# Patient Record
Sex: MALE | Race: WHITE | Employment: OTHER | URBAN - METROPOLITAN AREA
[De-identification: names, ages, dates, MRNs, and addresses within clinical notes are randomized per-mention and may not be internally consistent; named-entity substitution may affect disease eponyms.]

---

## 2019-07-18 ENCOUNTER — THERAPY VISIT (OUTPATIENT)
Dept: PHYSICAL THERAPY | Facility: CLINIC | Age: 84
End: 2019-07-18
Payer: COMMERCIAL

## 2019-07-18 DIAGNOSIS — M54.41 ACUTE BILATERAL LOW BACK PAIN WITH RIGHT-SIDED SCIATICA: ICD-10-CM

## 2019-07-18 PROCEDURE — 97161 PT EVAL LOW COMPLEX 20 MIN: CPT | Mod: GP | Performed by: PHYSICAL THERAPIST

## 2019-07-18 PROCEDURE — 97110 THERAPEUTIC EXERCISES: CPT | Mod: GP | Performed by: PHYSICAL THERAPIST

## 2019-07-18 NOTE — LETTER
[facility] PHYSICAL THERAPY  [address]  [place] [address]  [phone]    2019    Re: Tyler Cisneros   :   1935  MRN:  [id number]   REFERRING PHYSICIAN:   Haydee Espino    [facility] PHYSICAL THERAPY    Date of Initial Evaluation:  19  Visits:  Rxs Used: 1  Reason for Referral:  Acute bilateral low back pain with right-sided sciatica    EVALUATION SUMMARY    [facility] Initial Evaluation  Subjective:  The history is provided by the patient. No  was used.   Type of problem:  Lumbar   Condition occurred with:  Other reason (driving). This is a new condition   Problem details: Patient reports onset of low back and right leg pain beginning 6-8 weeks ago when he was driving..   Patient reports pain:  Lower lumbar spine. Radiates to:  Gluteals left, gluteals right, thigh right, lower leg right and foot right. Associated symptoms:  Loss of motion/stiffness. Symptoms are exacerbated by sitting and other (driving) and relieved by other (laying on his stomach).    Tyler Cisneros being seen for low back pain.   Problem began 7/10/2019 (MD orders). Where condition occurred: in the community.Problem occurred: driving  and reported as 8/10 on pain scale. General health as reported by patient is good. Pertinent medical history includes:  High blood pressure.    Surgeries include:  Orthopedic surgery. Other surgery history details: knee replacement.  Current medications:  High blood pressure medication.   Primary job tasks include:  Computer work and prolonged sitting.  Pain is described as sharp and aching and is intermittent.  Since onset symptoms are unchanged.  Patient is Retired.   Barriers include:  None as reported by patient.  Red flags:  None as reported by patient.                    Objective:    [place] Lumbar Evaluation    Posture:  Sitting: poor  Standing: fair  Lordosis: WNL  Lateral Shift: no  Correction of Posture: no  effect    Movement Loss:  Flexion (Flex): nil  Extension (EXT): mod  Side Glide R (SG R): mod  Side Glide L (SG L): min    Test Movements:  FIS: During: no effect  After: no effect      EIS: During: no effect  After: no effect    Repeat EIS: During: no effect  After: no effect  Mechanical Response: no effect    EIL: During: increases  After: no worse    Repeat EIL: During: increases and centralizing  After: better and centralizing  Mechanical Response: IncROM    Conclusion: derangement    Principle of Treatment:  Posture Correction: Neutral sitting with lumbar support    Extension: REIL 10x every 2-3 hours      Assessment/Plan:    Patient is a 84 year old male with lumbar complaints.    Patient has the following significant findings with corresponding treatment plan.                Diagnosis 1:  Low back pain  Pain -  self management, education, directional preference exercise and home program  Decreased ROM/flexibility - manual therapy, therapeutic exercise and home program  Impaired muscle performance - neuro re-education and home program  Decreased function - therapeutic activities and home program  Impaired posture - neuro re-education and home program    Therapy Evaluation Codes:     Cumulative Therapy Evaluation is: Low complexity.    Previous and current functional limitations:  (See Goal Flow Sheet for this information)    Short term and Long term goals: (See Goal Flow Sheet for this information)     Communication ability:  Patient appears to be able to clearly communicate and understand verbal and written communication and follow directions correctly.  Treatment Explanation - The following has been discussed with the patient:   RX ordered/plan of care  Anticipated outcomes  Possible risks and side effects  This patient would benefit from PT intervention to resume normal activities.   Rehab potential is good.    Frequency:  1 X week, once daily  Duration:  for 6 weeks  Discharge Plan:  Achieve all  LTG.  Independent in home treatment program.  Reach maximal therapeutic benefit.      Thank you for your referral.    INQUIRIES  Therapist: Jac Villarreal, Cert. BHUPINDER   [facility] PHYSICAL THERAPY  [address] [address]  Phone: [phone]  Fax: [phone]

## 2019-07-18 NOTE — PROGRESS NOTES
[facility] Initial Evaluation  Subjective:  The history is provided by the patient. No  was used.   Type of problem:  Lumbar   Condition occurred with:  Other reason (driving). This is a new condition   Problem details: Patient reports onset of low back and right leg pain beginning 6-8 weeks ago when he was driving..   Patient reports pain:  Lower lumbar spine. Radiates to:  Gluteals left, gluteals right, thigh right, lower leg right and foot right. Associated symptoms:  Loss of motion/stiffness. Symptoms are exacerbated by sitting and other (driving) and relieved by other (laying on his stomach).    Tyler Cisneros being seen for low back pain.   Problem began 7/10/2019 (MD orders). Where condition occurred: in the community.Problem occurred: driving  and reported as 8/10 on pain scale. General health as reported by patient is good. Pertinent medical history includes:  High blood pressure.    Surgeries include:  Orthopedic surgery. Other surgery history details: knee replacement.  Current medications:  High blood pressure medication.   Primary job tasks include:  Computer work and prolonged sitting.  Pain is described as sharp and aching and is intermittent.  Since onset symptoms are unchanged.      Patient is Retired.   Barriers include:  None as reported by patient.  Red flags:  None as reported by patient.                      Objective:  System    Physical Exam      [place] Lumbar Evaluation    Posture:  Sitting: poor  Standing: fair  Lordosis: WNL  Lateral Shift: no  Correction of Posture: no effect    Movement Loss:  Flexion (Flex): nil  Extension (EXT): mod  Side Glide R (SG R): mod  Side Glide L (SG L): min  Test Movements:  FIS: During: no effect  After: no effect      EIS: During: no effect  After: no effect    Repeat EIS: During: no effect  After: no effect  Mechanical Response: no effect    EIL: During: increases  After: no worse    Repeat EIL: During:  increases and centralizing  After: better and centralizing  Mechanical Response: IncROM        Conclusion: derangement  Principle of Treatment:  Posture Correction: Neutral sitting with lumbar support    Extension: REIL 10x every 2-3 hours                                           ROS    Assessment/Plan:    Patient is a 84 year old male with lumbar complaints.    Patient has the following significant findings with corresponding treatment plan.                Diagnosis 1:  Low back pain  Pain -  self management, education, directional preference exercise and home program  Decreased ROM/flexibility - manual therapy, therapeutic exercise and home program  Impaired muscle performance - neuro re-education and home program  Decreased function - therapeutic activities and home program  Impaired posture - neuro re-education and home program    Therapy Evaluation Codes:     Cumulative Therapy Evaluation is: Low complexity.    Previous and current functional limitations:  (See Goal Flow Sheet for this information)    Short term and Long term goals: (See Goal Flow Sheet for this information)     Communication ability:  Patient appears to be able to clearly communicate and understand verbal and written communication and follow directions correctly.  Treatment Explanation - The following has been discussed with the patient:   RX ordered/plan of care  Anticipated outcomes  Possible risks and side effects  This patient would benefit from PT intervention to resume normal activities.   Rehab potential is good.    Frequency:  1 X week, once daily  Duration:  for 6 weeks  Discharge Plan:  Achieve all LTG.  Independent in home treatment program.  Reach maximal therapeutic benefit.    Please refer to the daily flowsheet for treatment today, total treatment time and time spent performing 1:1 timed codes.

## 2019-11-12 PROBLEM — M54.41 ACUTE BILATERAL LOW BACK PAIN WITH RIGHT-SIDED SCIATICA: Status: RESOLVED | Noted: 2019-07-18 | Resolved: 2019-11-12

## 2019-11-12 NOTE — PROGRESS NOTES
Patient did not return to PT following initial evaluation on 7/18/19. Please let evaluation information serve as discharge information.

## 2021-02-26 ENCOUNTER — IMMUNIZATION (OUTPATIENT)
Dept: PEDIATRICS | Facility: CLINIC | Age: 86
End: 2021-02-26
Payer: COMMERCIAL

## 2021-02-26 PROCEDURE — 0001A PR COVID VAC PFIZER DIL RECON 30 MCG/0.3 ML IM: CPT

## 2021-02-26 PROCEDURE — 91300 PR COVID VAC PFIZER DIL RECON 30 MCG/0.3 ML IM: CPT

## 2021-02-27 ENCOUNTER — HEALTH MAINTENANCE LETTER (OUTPATIENT)
Age: 86
End: 2021-02-27

## 2021-03-19 ENCOUNTER — IMMUNIZATION (OUTPATIENT)
Dept: PEDIATRICS | Facility: CLINIC | Age: 86
End: 2021-03-19
Attending: INTERNAL MEDICINE
Payer: COMMERCIAL

## 2021-03-19 PROCEDURE — 0002A PR COVID VAC PFIZER DIL RECON 30 MCG/0.3 ML IM: CPT

## 2021-03-19 PROCEDURE — 91300 PR COVID VAC PFIZER DIL RECON 30 MCG/0.3 ML IM: CPT

## 2021-10-02 ENCOUNTER — HEALTH MAINTENANCE LETTER (OUTPATIENT)
Age: 86
End: 2021-10-02

## 2022-03-19 ENCOUNTER — HEALTH MAINTENANCE LETTER (OUTPATIENT)
Age: 87
End: 2022-03-19

## 2022-09-03 ENCOUNTER — HEALTH MAINTENANCE LETTER (OUTPATIENT)
Age: 87
End: 2022-09-03

## 2023-04-23 ENCOUNTER — HEALTH MAINTENANCE LETTER (OUTPATIENT)
Age: 88
End: 2023-04-23

## 2025-04-30 ENCOUNTER — TELEPHONE (OUTPATIENT)
Dept: GERIATRICS | Facility: CLINIC | Age: 89
End: 2025-04-30
Payer: COMMERCIAL

## 2025-04-30 DIAGNOSIS — R52 PAIN: Primary | ICD-10-CM

## 2025-04-30 RX ORDER — ACETAMINOPHEN 500 MG
1000 TABLET ORAL 3 TIMES DAILY
Status: SHIPPED
Start: 2025-04-30

## 2025-04-30 RX ORDER — OXYCODONE HYDROCHLORIDE 5 MG/1
5 TABLET ORAL EVERY 4 HOURS PRN
Qty: 18 TABLET | Refills: 0 | Status: SHIPPED | OUTPATIENT
Start: 2025-04-30 | End: 2025-05-01

## 2025-04-30 NOTE — TELEPHONE ENCOUNTER
[facility] TELEPHONE ENCOUNTER        Tyler Cisneros is a 89 year old  (1935),Nurse Loan called today to report: Patient new admit to TCU- to be patient of [facility] Geriatric team, Dr. Castellon and NP Moiz. S/p RTKA. Requesting increase in prn oxycodone to 5 mg (currently 2.5 mg). Just discharged from hospital yesterday. Current pain regimen includes prn acetaminophen, ibuprofen and oxycodone. No scheduled medications.     ASSESSMENT/PLAN  S/P RTKA,   Pain    ORDERS:    Discontinue current acetaminophen order    Acetaminophen 500 mg tablet- give 1000 mg po TID      -continue ibuprofen 400 mg po q 6 hours prn  - continue oxycodone 2.5 mg po q 4 hours prn  - continue to offer ice pack 20 minute intervals prn  - update primary providers in a.m if not effectively relieving pain    ADDENDUM  Received a call back a couple of hours later that pain is still not controlled. Different nurse who clarified that patient has been receiving Tylenol frequently though it is prn. Received 3 doses today and Ibuprofen twice plus the prn oxycodone 2.5 mg q 4 hours without effective pain relief.     Orders:     Continue newly scheduled acetaminophen  Increase prn oxycodone to 5 mg q 4 hours prn  Update primary NP tomorrow.     Electronically signed by:   CARMEL Kumar CNP

## 2025-05-01 ENCOUNTER — TRANSITIONAL CARE UNIT VISIT (OUTPATIENT)
Dept: GERIATRICS | Facility: CLINIC | Age: 89
End: 2025-05-01
Payer: COMMERCIAL

## 2025-05-01 ENCOUNTER — TELEPHONE (OUTPATIENT)
Dept: GERIATRICS | Facility: CLINIC | Age: 89
End: 2025-05-01

## 2025-05-01 ENCOUNTER — TRANSFERRED RECORDS (OUTPATIENT)
Dept: HEALTH INFORMATION MANAGEMENT | Facility: CLINIC | Age: 89
End: 2025-05-01

## 2025-05-01 ENCOUNTER — DOCUMENTATION ONLY (OUTPATIENT)
Dept: GERIATRICS | Facility: CLINIC | Age: 89
End: 2025-05-01

## 2025-05-01 VITALS
TEMPERATURE: 99.3 F | DIASTOLIC BLOOD PRESSURE: 66 MMHG | WEIGHT: 191 LBS | RESPIRATION RATE: 17 BRPM | SYSTOLIC BLOOD PRESSURE: 160 MMHG | HEART RATE: 69 BPM | OXYGEN SATURATION: 95 %

## 2025-05-01 DIAGNOSIS — I10 HYPERTENSION, UNSPECIFIED TYPE: ICD-10-CM

## 2025-05-01 DIAGNOSIS — Z71.89 ACP (ADVANCE CARE PLANNING): ICD-10-CM

## 2025-05-01 DIAGNOSIS — R60.0 LEG EDEMA, RIGHT: ICD-10-CM

## 2025-05-01 DIAGNOSIS — K59.01 SLOW TRANSIT CONSTIPATION: ICD-10-CM

## 2025-05-01 DIAGNOSIS — M25.561 ACUTE PAIN OF RIGHT KNEE: ICD-10-CM

## 2025-05-01 DIAGNOSIS — Z96.651 S/P TOTAL KNEE ARTHROPLASTY, RIGHT: ICD-10-CM

## 2025-05-01 DIAGNOSIS — R45.1 AGITATION: ICD-10-CM

## 2025-05-01 DIAGNOSIS — M17.11 ARTHRITIS OF RIGHT KNEE: Primary | ICD-10-CM

## 2025-05-01 RX ORDER — AMLODIPINE BESYLATE 10 MG/1
1 TABLET ORAL DAILY
COMMUNITY
Start: 2024-03-19

## 2025-05-01 RX ORDER — ASPIRIN 81 MG/1
81 TABLET, COATED ORAL 2 TIMES DAILY
COMMUNITY
Start: 2025-04-28 | End: 2025-05-28

## 2025-05-01 RX ORDER — SENNOSIDES 8.6 MG
2 TABLET ORAL DAILY
COMMUNITY

## 2025-05-01 RX ORDER — OXYCODONE HYDROCHLORIDE 5 MG/1
5 TABLET ORAL EVERY 4 HOURS
Status: SHIPPED
Start: 2025-05-01 | End: 2025-05-01

## 2025-05-01 RX ORDER — IBUPROFEN 400 MG/1
400 TABLET, FILM COATED ORAL EVERY 6 HOURS PRN
COMMUNITY
Start: 2025-04-29 | End: 2025-05-01

## 2025-05-01 RX ORDER — LOSARTAN POTASSIUM 50 MG/1
50 TABLET ORAL DAILY
COMMUNITY

## 2025-05-01 RX ORDER — OXYCODONE HYDROCHLORIDE 5 MG/1
5 TABLET ORAL EVERY 4 HOURS
Qty: 60 TABLET | Refills: 0 | Status: SHIPPED | OUTPATIENT
Start: 2025-05-01

## 2025-05-01 RX ORDER — POLYETHYLENE GLYCOL 3350 17 G/17G
17 POWDER, FOR SOLUTION ORAL DAILY
COMMUNITY

## 2025-05-01 NOTE — LETTER
5/1/2025      Tyler Cisneros  [address]  [place] [address]        [facility] GERIATRICS    PRIMARY CARE PROVIDER AND CLINIC:  Jamila Sahu MD, [address] [facility] [address]  Chief Complaint   Patient presents with     Hospital F/U      [place] Medical Record Number:  [id number]  Place of Service where encounter took place:  [facility]) [[id number]]    Tyler Cisneros  is a 89 year old  (1935), admitted to the above facility from  [facility] . Hospital stay 4/28/2025 through 4/29/2025..   HPI:    1. Arthritis of right knee    2. S/P total knee arthroplasty, right    3. Acute pain of right knee    4. Leg edema, right    5. Slow transit constipation    6. Hypertension, unspecified type    7. Agitation    8. ACP (advance care planning)      Patient with above recent Dx/Hx, post elective R TKA, tolerated well, dressing intact, WBAT, independently transferring in room today, pain+, reports no recent BM, very displeased with being unable to control/have more oxycodone, called 911 last night to have police come and make staff give more oxycodone, argumentative and angry with all staff including myself, non-physical, attempt to console ineffective, was agreeable to plan changes below, SBP's elevated possibly due to pain or anger, overall appears healthy.    Advance Care Planning Goals of Care Discussion 5/1/2025  Goals of care discussed with Tyler Cisneros on 5/1. Present at discussion: patient. Questions discussed and patient response:  What is your understanding now of where you are with your illness?: good. How much information about what is likely to be ahead with your illness would you like to have?: all. As the clinician I communicated the following to the patient regarding their prognosis: good. If your health situation worsens, what are your most important goals?: it wont. What are your biggest fears and worries about the future with your health?: none.  Unacceptable function : NA. What abilities are so critical to your life that you cannot imagine living without them?: pain control. Pt does NOT want to: die. If you become sicker, how much are you willing to go through for the possibility of gaining more time?: I wont. Would this change if these were permanent states, if they did not get better?: no. How much does your agent and/or family know about your priorities and wishes?: nothing. Added by CARMEL Lares CNP       CODE STATUS/ADVANCE DIRECTIVES DISCUSSION:  Full Code  CPR/Full code   ALLERGIES: No Known Allergies   PAST MEDICAL HISTORY: History reviewed. No pertinent past medical history.   PAST SURGICAL HISTORY:   has no past surgical history on file.  FAMILY HISTORY: Family history is unknown by patient.  SOCIAL HISTORY:   reports that he has never smoked. He has never used smokeless tobacco.  Patient's living condition: lives in an assisted living facility    Post Discharge Medication Reconciliation Status:   MED REC REQUIRED  Post Medication Reconciliation Status: discharge medications reconciled and changed, per note/orders       Current Outpatient Medications   Medication Sig Dispense Refill     amLODIPine (NORVASC) 10 MG tablet Take 1 tablet by mouth daily.       aspirin (ASA) 81 MG EC tablet Take 81 mg by mouth 2 times daily.       Multiple Vitamins-Minerals (PRESERVISION AREDS 2 PO) Take 2 tablets by mouth daily.       oxyCODONE (ROXICODONE) 5 MG tablet Take 1 tablet (5 mg) by mouth every 4 hours. And 5mg Q4h PRN       polyethylene glycol (MIRALAX) 17 g packet Take 17 g by mouth daily.       sennosides (SENOKOT) 8.6 MG tablet Take 2 tablets by mouth daily.       acetaminophen (TYLENOL) 500 MG tablet Take 2 tablets (1,000 mg) by mouth 3 times daily.       losartan (COZAAR) 50 MG tablet Take 50 mg by mouth daily.       omeprazole 20 MG tablet Take 20 mg by mouth daily.       No current facility-administered medications for this visit.        ROS:  4 point ROS including Respiratory, CV, GI and , other than that noted in the HPI,  is negative    Vitals:  BP (!) 160/66   Pulse 69   Temp 99.3  F (37.4  C)   Resp 17   Wt 86.6 kg (191 lb)   SpO2 95%   Exam:  GENERAL APPEARANCE:  appears healthy  ENT:  Mouth and posterior oropharynx normal, moist mucous membranes  RESP:  lungs clear to auscultation , no respiratory distress  CV:  peripheral edema 1-2 firm+ in RLE, rate-normal  ABDOMEN:  bowel sounds normal  M/S:   Gait and station abnormal unsteady  SKIN:  Inspection of skin and subcutaneous tissue baseline  NEURO:   Examination of sensation by touch normal  PSYCH:  affect and mood normal    Lab/Diagnostic data:  Recent labs in [facility] reviewed by me today.     ASSESSMENT/PLAN:    (M17.11) Arthritis of right knee  (primary encounter diagnosis)  (Z96.651) S/P total knee arthroplasty, right  (M25.561) Acute pain of right knee  (R60.0) Leg edema, right  (K59.01) Slow transit constipation  Comment: extreme pain, dressing CDI, no recent BM, s/p orif 4/28, independent transfers  Plan:   -PT/OT eval and treat  -US RLE for edema  -lymph eval  -tubigrip RLE knee high  -change oxycodone to 5mg Q4h and Q4h PRN  -discontinue ibuprofen  -continue ASA81 BID  -continue APAP TID  -change miralax to daily  -change senna to 2 tabs daily  -CBC, BMP on 5/5  -follow up ortho PRN    (I10) Hypertension, unspecified type  Comment: SBP's 160 range  Plan:   -continue PTA amlodipine, losartan  -daily vitals    (R45.1) Agitation  Comment: unsure if normal personality, pain or opioid use, argumentative, not pleasant to staff  Plan:   -increase oxycodone as requested  -informed nursing of status  -of note, already standing, per therapy will be short stay    (Z71.89) ACP (advance care planning)  Comment: full code  Plan: [place] ADVANCE CARE PLANNING FIRST 30 MINS          I spent 17 minutes F2F with patient in addition to todays visit completing a POLST form.        Electronically  signed by:  CARMEL Lares CNP                    Sincerely,        CARMEL Lares CNP    Electronically signed

## 2025-05-01 NOTE — PROGRESS NOTES
Nurse calling to report pelvis pain. Recent right knee replacement. Uncontrolled  VSS, no nausea or vomiting, urinating okay.   Report no recent BM, not on stool softeners.     Please give another Oxy 5mg po now x1, add senna-s 1 bid, PVR scan to make sure not retaining. Rectal supp x 1.     Update primary today, may need Abd x ray.   Call back if worsening symptoms.     Aurora WOODWARD NP

## 2025-05-01 NOTE — PROGRESS NOTES
[facility] GERIATRICS    PRIMARY CARE PROVIDER AND CLINIC:  Jamila Sahu MD, [address]  / [facility] [address]  Chief Complaint   Patient presents with    Hospital F/U      [place] Medical Record Number:  [id number]  Place of Service where encounter took place:  [facility] [[id number]]    Tyler Cisneros  is a 89 year old  (1935), admitted to the above facility from  [facility] . Hospital stay 4/28/2025 through 4/29/2025..   HPI:    1. Arthritis of right knee    2. S/P total knee arthroplasty, right    3. Acute pain of right knee    4. Leg edema, right    5. Slow transit constipation    6. Hypertension, unspecified type    7. Agitation    8. ACP (advance care planning)      Patient with above recent Dx/Hx, post elective R TKA, tolerated well, dressing intact, WBAT, independently transferring in room today, pain+, reports no recent BM, very displeased with being unable to control/have more oxycodone, called 911 last night to have police come and make staff give more oxycodone, argumentative and angry with all staff including myself, non-physical, attempt to console ineffective, was agreeable to plan changes below, SBP's elevated possibly due to pain or anger, overall appears healthy.    Advance Care Planning Goals of Care Discussion 5/1/2025  Goals of care discussed with Tyler Cisneros on 5/1. Present at discussion: patient. Questions discussed and patient response:  What is your understanding now of where you are with your illness?: good. How much information about what is likely to be ahead with your illness would you like to have?: all. As the clinician I communicated the following to the patient regarding their prognosis: good. If your health situation worsens, what are your most important goals?: it wont. What are your biggest fears and worries about the future with your health?: none. Unacceptable function : NA. What abilities are so critical to your life that you cannot  imagine living without them?: pain control. Pt does NOT want to: die. If you become sicker, how much are you willing to go through for the possibility of gaining more time?: I wont. Would this change if these were permanent states, if they did not get better?: no. How much does your agent and/or family know about your priorities and wishes?: nothing. Added by CARMEL Lares CNP       CODE STATUS/ADVANCE DIRECTIVES DISCUSSION:  Full Code  CPR/Full code   ALLERGIES: No Known Allergies   PAST MEDICAL HISTORY: History reviewed. No pertinent past medical history.   PAST SURGICAL HISTORY:   has no past surgical history on file.  FAMILY HISTORY: Family history is unknown by patient.  SOCIAL HISTORY:   reports that he has never smoked. He has never used smokeless tobacco.  Patient's living condition: lives in an assisted living facility    Post Discharge Medication Reconciliation Status:   MED REC REQUIRED  Post Medication Reconciliation Status: discharge medications reconciled and changed, per note/orders       Current Outpatient Medications   Medication Sig Dispense Refill    amLODIPine (NORVASC) 10 MG tablet Take 1 tablet by mouth daily.      aspirin (ASA) 81 MG EC tablet Take 81 mg by mouth 2 times daily.      Multiple Vitamins-Minerals (PRESERVISION AREDS 2 PO) Take 2 tablets by mouth daily.      oxyCODONE (ROXICODONE) 5 MG tablet Take 1 tablet (5 mg) by mouth every 4 hours. And 5mg Q4h PRN      polyethylene glycol (MIRALAX) 17 g packet Take 17 g by mouth daily.      sennosides (SENOKOT) 8.6 MG tablet Take 2 tablets by mouth daily.      acetaminophen (TYLENOL) 500 MG tablet Take 2 tablets (1,000 mg) by mouth 3 times daily.      losartan (COZAAR) 50 MG tablet Take 50 mg by mouth daily.      omeprazole 20 MG tablet Take 20 mg by mouth daily.       No current facility-administered medications for this visit.       ROS:  4 point ROS including Respiratory, CV, GI and , other than that noted in the HPI,  is  negative    Vitals:  BP (!) 160/66   Pulse 69   Temp 99.3  F (37.4  C)   Resp 17   Wt 86.6 kg (191 lb)   SpO2 95%   Exam:  GENERAL APPEARANCE:  appears healthy  ENT:  Mouth and posterior oropharynx normal, moist mucous membranes  RESP:  lungs clear to auscultation , no respiratory distress  CV:  peripheral edema 1-2 firm+ in RLE, rate-normal  ABDOMEN:  bowel sounds normal  M/S:   Gait and station abnormal unsteady  SKIN:  Inspection of skin and subcutaneous tissue baseline  NEURO:   Examination of sensation by touch normal  PSYCH:  affect and mood normal    Lab/Diagnostic data:  Recent labs in [facility] reviewed by me today.     ASSESSMENT/PLAN:    (M17.11) Arthritis of right knee  (primary encounter diagnosis)  (Z96.651) S/P total knee arthroplasty, right  (M25.561) Acute pain of right knee  (R60.0) Leg edema, right  (K59.01) Slow transit constipation  Comment: extreme pain, dressing CDI, no recent BM, s/p orif 4/28, independent transfers  Plan:   -PT/OT eval and treat  -US RLE for edema  -lymph eval  -tubigrip RLE knee high  -change oxycodone to 5mg Q4h and Q4h PRN  -discontinue ibuprofen  -continue ASA81 BID  -continue APAP TID  -change miralax to daily  -change senna to 2 tabs daily  -CBC, BMP on 5/5  -follow up ortho PRN    (I10) Hypertension, unspecified type  Comment: SBP's 160 range  Plan:   -continue PTA amlodipine, losartan  -daily vitals    (R45.1) Agitation  Comment: unsure if normal personality, pain or opioid use, argumentative, not pleasant to staff  Plan:   -increase oxycodone as requested  -informed nursing of status  -of note, already standing, per therapy will be short stay    (Z71.89) ACP (advance care planning)  Comment: full code  Plan: [place] ADVANCE CARE PLANNING FIRST 30 MINS          I spent 17 minutes F2F with patient in addition to todays visit completing a POLST form.    ADDEND: patient has changed mind about oxycodone, wants to have some input on the dose he gets based on pain level.   Changed to 5mg Q4h scheduled, 2.5mg Q4h PRN pain 1-5/10, 5mg Q4h PRN pain 6-10/10, sent Rx to [facility] pharmacy along with Narcan script in event of complications regarding opioid use.    Electronically signed by:  CARMEL Lares CNP

## 2025-05-05 ENCOUNTER — LAB REQUISITION (OUTPATIENT)
Dept: LAB | Facility: CLINIC | Age: 89
End: 2025-05-05
Payer: COMMERCIAL

## 2025-05-05 DIAGNOSIS — D64.9 ANEMIA, UNSPECIFIED: ICD-10-CM

## 2025-05-05 DIAGNOSIS — N18.9 CHRONIC KIDNEY DISEASE, UNSPECIFIED: ICD-10-CM

## 2025-05-05 LAB
ANION GAP SERPL CALCULATED.3IONS-SCNC: 9 MMOL/L (ref 7–15)
BUN SERPL-MCNC: 18.1 MG/DL (ref 8–23)
CALCIUM SERPL-MCNC: 8.5 MG/DL (ref 8.8–10.4)
CHLORIDE SERPL-SCNC: 101 MMOL/L (ref 98–107)
CREAT SERPL-MCNC: 0.99 MG/DL (ref 0.67–1.17)
EGFRCR SERPLBLD CKD-EPI 2021: 73 ML/MIN/1.73M2
ERYTHROCYTE [DISTWIDTH] IN BLOOD BY AUTOMATED COUNT: 12.6 % (ref 10–15)
GLUCOSE SERPL-MCNC: 88 MG/DL (ref 70–99)
HCO3 SERPL-SCNC: 25 MMOL/L (ref 22–29)
HCT VFR BLD AUTO: 35.3 % (ref 40–53)
HGB BLD-MCNC: 11.2 G/DL (ref 13.3–17.7)
MCH RBC QN AUTO: 30.7 PG (ref 26.5–33)
MCHC RBC AUTO-ENTMCNC: 31.7 G/DL (ref 31.5–36.5)
MCV RBC AUTO: 97 FL (ref 78–100)
PLATELET # BLD AUTO: 200 10E3/UL (ref 150–450)
POTASSIUM SERPL-SCNC: 4.6 MMOL/L (ref 3.4–5.3)
RBC # BLD AUTO: 3.65 10E6/UL (ref 4.4–5.9)
SODIUM SERPL-SCNC: 135 MMOL/L (ref 135–145)
WBC # BLD AUTO: 8.4 10E3/UL (ref 4–11)

## 2025-05-05 PROCEDURE — 85027 COMPLETE CBC AUTOMATED: CPT | Mod: ORL | Performed by: NURSE PRACTITIONER

## 2025-05-05 PROCEDURE — 80048 BASIC METABOLIC PNL TOTAL CA: CPT | Mod: ORL | Performed by: NURSE PRACTITIONER

## 2025-05-06 ENCOUNTER — TRANSITIONAL CARE UNIT VISIT (OUTPATIENT)
Dept: GERIATRICS | Facility: CLINIC | Age: 89
End: 2025-05-06
Payer: COMMERCIAL

## 2025-05-06 VITALS
WEIGHT: 192 LBS | RESPIRATION RATE: 17 BRPM | DIASTOLIC BLOOD PRESSURE: 65 MMHG | OXYGEN SATURATION: 92 % | TEMPERATURE: 98.2 F | HEART RATE: 66 BPM | SYSTOLIC BLOOD PRESSURE: 158 MMHG

## 2025-05-06 DIAGNOSIS — Z96.651 S/P TOTAL KNEE ARTHROPLASTY, RIGHT: ICD-10-CM

## 2025-05-06 DIAGNOSIS — M25.561 ACUTE PAIN OF RIGHT KNEE: ICD-10-CM

## 2025-05-06 DIAGNOSIS — M17.11 ARTHRITIS OF RIGHT KNEE: Primary | ICD-10-CM

## 2025-05-06 DIAGNOSIS — L03.115 CELLULITIS OF RIGHT KNEE: ICD-10-CM

## 2025-05-06 PROCEDURE — 99309 SBSQ NF CARE MODERATE MDM 30: CPT | Performed by: NURSE PRACTITIONER

## 2025-05-06 RX ORDER — CEPHALEXIN 500 MG/1
500 CAPSULE ORAL 3 TIMES DAILY
Status: SHIPPED
Start: 2025-05-06 | End: 2025-05-13

## 2025-05-06 NOTE — LETTER
5/6/2025      Tyler Cisneros  [address] [place] [address]        [facility] GERIATRICS    Chief Complaint   Patient presents with     RECHECK     HPI:  Tyler Cisneros is a 89 year old  (1935), who is being seen today for an episodic care visit at: [facility] AT [facility] ([facility]) [[id number]]. Today's concern is:   1. Arthritis of right knee    2. Acute pain of right knee    3. Cellulitis of right knee    4. S/P total knee arthroplasty, right      Patient seen for follow up, elective ORIF R knee due to arthritis and pain, dressing intact, firm edema, doppler on 5/1 negative DVT, afebrile, medial knee has small area outside of dressing that is pink, warm.    Allergies, and PMH/PSH reviewed in EPIC today.  REVIEW OF SYSTEMS:  4 point ROS including Respiratory, CV, GI and , other than that noted in the HPI,  is negative    Objective:   BP (!) 158/65   Pulse 66   Temp 98.2  F (36.8  C)   Resp 17   Wt 87.1 kg (192 lb)   SpO2 92%   GENERAL APPEARANCE:  in no distress, appears healthy  ENT:  Mouth and posterior oropharynx normal, moist mucous membranes  RESP:  lungs clear to auscultation , no respiratory distress  CV:  peripheral edema mild/firm+ in RLE, rate-normal  ABDOMEN:  bowel sounds normal  M/S:   Gait and station abnormal unsteady  SKIN:  Inspection of skin and subcutaneous tissue baseline  NEURO:   Examination of sensation by touch normal  PSYCH:  affect and mood normal    Labs done in SNF are in [facility]. Please refer to them using [facility]/Care Everywhere.    Assessment/Plan:  (M17.11) Arthritis of right knee  (primary encounter diagnosis)  (M25.561) Acute pain of right knee  (L03.115) Cellulitis of right knee  (Z96.651) S/P total knee arthroplasty, right  Comment: pain+, edema+, afebrile, pink area on knee  Plan:   -PT/OT working with  -start keflex TID x7 days  -keep dressing intact  -continue oxycodone Q4h plus Q4h PRN  -miralax, senna daily  -follow up ortho 5/13    MED  REC REQUIRED  Post Medication Reconciliation Status: medication reconcilation previously completed during another office visit        Electronically signed by: CARMEL Lares CNP          Sincerely,        CARMEL Lares CNP    Electronically signed

## 2025-05-06 NOTE — PROGRESS NOTES
[facility] GERIATRICS    Chief Complaint   Patient presents with    RECHECK     HPI:  Tyler Cisneros is a 89 year old  (1935), who is being seen today for an episodic care visit at: [facility] AT [facility] ([facility]) [[id number]]. Today's concern is:   1. Arthritis of right knee    2. Acute pain of right knee    3. Cellulitis of right knee    4. S/P total knee arthroplasty, right      Patient seen for follow up, elective ORIF R knee due to arthritis and pain, dressing intact, firm edema, doppler on 5/1 negative DVT, afebrile, medial knee has small area outside of dressing that is pink, warm.    Allergies, and PMH/PSH reviewed in EPIC today.  REVIEW OF SYSTEMS:  4 point ROS including Respiratory, CV, GI and , other than that noted in the HPI,  is negative    Objective:   BP (!) 158/65   Pulse 66   Temp 98.2  F (36.8  C)   Resp 17   Wt 87.1 kg (192 lb)   SpO2 92%   GENERAL APPEARANCE:  in no distress, appears healthy  ENT:  Mouth and posterior oropharynx normal, moist mucous membranes  RESP:  lungs clear to auscultation , no respiratory distress  CV:  peripheral edema mild/firm+ in RLE, rate-normal  ABDOMEN:  bowel sounds normal  M/S:   Gait and station abnormal unsteady  SKIN:  Inspection of skin and subcutaneous tissue baseline  NEURO:   Examination of sensation by touch normal  PSYCH:  affect and mood normal    Labs done in SNF are in [facility]. Please refer to them using [facility]/Care Everywhere.    Assessment/Plan:  (M17.11) Arthritis of right knee  (primary encounter diagnosis)  (M25.561) Acute pain of right knee  (L03.115) Cellulitis of right knee  (Z96.651) S/P total knee arthroplasty, right  Comment: pain+, edema+, afebrile, pink area on knee  Plan:   -PT/OT working with  -start keflex TID x7 days  -keep dressing intact  -continue oxycodone Q4h plus Q4h PRN  -miralax, senna daily  -follow up ortho 5/13    MED REC REQUIRED  Post Medication Reconciliation Status: medication reconcilation previously  completed during another office visit        Electronically signed by: CARMEL Lares CNP

## 2025-05-11 ENCOUNTER — HEALTH MAINTENANCE LETTER (OUTPATIENT)
Age: 89
End: 2025-05-11

## 2025-05-13 ENCOUNTER — DISCHARGE SUMMARY NURSING HOME (OUTPATIENT)
Dept: GERIATRICS | Facility: CLINIC | Age: 89
End: 2025-05-13
Payer: COMMERCIAL

## 2025-05-13 VITALS
DIASTOLIC BLOOD PRESSURE: 61 MMHG | HEART RATE: 70 BPM | WEIGHT: 196 LBS | RESPIRATION RATE: 18 BRPM | OXYGEN SATURATION: 95 % | SYSTOLIC BLOOD PRESSURE: 163 MMHG | TEMPERATURE: 97.2 F

## 2025-05-13 DIAGNOSIS — M25.561 ACUTE PAIN OF RIGHT KNEE: ICD-10-CM

## 2025-05-13 DIAGNOSIS — R45.1 AGITATION: ICD-10-CM

## 2025-05-13 DIAGNOSIS — K59.01 SLOW TRANSIT CONSTIPATION: ICD-10-CM

## 2025-05-13 DIAGNOSIS — I10 HYPERTENSION, UNSPECIFIED TYPE: ICD-10-CM

## 2025-05-13 DIAGNOSIS — R60.0 LEG EDEMA, RIGHT: ICD-10-CM

## 2025-05-13 DIAGNOSIS — Z96.651 S/P TOTAL KNEE ARTHROPLASTY, RIGHT: ICD-10-CM

## 2025-05-13 DIAGNOSIS — M17.11 ARTHRITIS OF RIGHT KNEE: Primary | ICD-10-CM

## 2025-05-13 PROCEDURE — 99316 NF DSCHRG MGMT 30 MIN+: CPT | Performed by: NURSE PRACTITIONER

## 2025-05-13 NOTE — PROGRESS NOTES
[facility] GERIATRICS DISCHARGE SUMMARY  PATIENT'S NAME: Tyler Cisneros  YOB: 1935  MEDICAL RECORD NUMBER:  [id number]  Place of Service where encounter took place:  [facility]  AT [facility] ([facility]) [[id number]]    PRIMARY CARE PROVIDER AND CLINIC RESPONSIBLE AFTER TRANSFER:   Jamila Sahu MD, [address]  / [facility] [id number]    Non-G Provider     Transferring providers: CARMEL Reid CNP; Barry Castellon MD  Recent Hospitalization/ED:  [facility]  stay 4/28 to 4/29/2025.  Date of SNF Admission: 4/29/2025  Date of SNF (anticipated) Discharge: 5/15/2025  Discharged to: previous independent home  Cognitive Scores: NA  Physical Function: Pivot transfers  DME: Walker    CODE STATUS/ADVANCE DIRECTIVES DISCUSSION:  Full Code   ALLERGIES: Patient has no known allergies.    NURSING FACILITY COURSE   Medication Changes/Rationale:   See notes    Summary of nursing facility stay:      Arthritis of right knee  S/P total knee arthroplasty, right  Acute pain of right knee  Leg edema, right  Slow transit constipation  Hypertension, unspecified type  Agitation    (M17.11) Arthritis of right knee  (primary encounter diagnosis)  (Z96.651) S/P total knee arthroplasty, right  (M25.561) Acute pain of right knee  (R60.0) Leg edema, right  (K59.01) Slow transit constipation  Comment: extreme pain, dressing CDI, no recent BM, s/p orif 4/28, independent transfers  Plan:   -PT/OT eval and treat  -US RLE for edema  -lymph eval  -start keflex x7 days  -tubigrip RLE knee high  -change oxycodone to 5mg Q4h and Q4h PRN  -discontinue ibuprofen  -continue ASA81 BID  -continue APAP TID  -change miralax to daily  -change senna to 2 tabs daily  -CBC, BMP on 5/5  -follow up ortho 5/13     (I10) Hypertension, unspecified type  Comment: SBP's 160 range  Plan:   -continue PTA amlodipine, losartan  -daily vitals     (R45.1) Agitation  Comment: unsure if normal personality, pain or opioid use, argumentative, not  pleasant to staff  Plan:   -increase oxycodone as requested  -informed nursing of status  -of note, already standing, per therapy will be short stay    Discharge Medications:  MED REC REQUIRED  Post Medication Reconciliation Status: medication reconcilation previously completed during another office visit       Current Outpatient Medications   Medication Sig Dispense Refill    acetaminophen (TYLENOL) 500 MG tablet Take 2 tablets (1,000 mg) by mouth 3 times daily.      amLODIPine (NORVASC) 10 MG tablet Take 1 tablet by mouth daily.      aspirin (ASA) 81 MG EC tablet Take 81 mg by mouth 2 times daily.      cephALEXin (KEFLEX) 500 MG capsule Take 1 capsule (500 mg) by mouth 3 times daily for 7 days.      losartan (COZAAR) 50 MG tablet Take 50 mg by mouth daily.      Multiple Vitamins-Minerals (PRESERVISION AREDS 2 PO) Take 2 tablets by mouth daily.      naloxone (NARCAN) 4 MG/0.1ML nasal spray Spray 1 spray (4 mg) into one nostril alternating nostrils as needed for opioid reversal. every 2-3 minutes until assistance arrives      omeprazole 20 MG tablet Take 20 mg by mouth daily.      oxyCODONE (ROXICODONE) 5 MG tablet Take 1 tablet (5 mg) by mouth every 4 hours. And 2.5mg Q4h PRN pain 1-5/10, And 5mg Q4h PRN for pain 6-10/10. 60 tablet 0    polyethylene glycol (MIRALAX) 17 g packet Take 17 g by mouth daily.      sennosides (SENOKOT) 8.6 MG tablet Take 2 tablets by mouth daily.            Controlled medications:   Oxycodone being sent home     Past Medical History: No past medical history on file.  Physical Exam:   Vitals: BP (!) 163/61   Pulse 70   Temp 97.2  F (36.2  C)   Resp 18   Wt 88.9 kg (196 lb)   SpO2 95%   BMI: There is no height or weight on file to calculate BMI.  GENERAL APPEARANCE:  in no distress, appears healthy  ENT:  Mouth and posterior oropharynx normal, moist mucous membranes  RESP:  lungs clear to auscultation , no respiratory distress  CV:  peripheral edema mild+ in RLE, rate-normal  ABDOMEN:   bowel sounds normal  M/S:   Gait and station abnormal unsteady  SKIN:  Inspection of skin and subcutaneous tissue baseline  NEURO:   Examination of sensation by touch normal  PSYCH:  affect and mood normal     SNF labs: Labs done in SNF are in [facility] EPIC. Please refer to them using EPIC/Care Everywhere.    DISCHARGE PLAN:  Follow up labs: No labs orders/due  Medical Follow Up:      Follow up with primary care provider in 1 weeks  [facility] scheduled appointments:  Appointments in Next Year      May 13, 2025 10:00 AM  Discharge Summary with CARMEL Lares CNP  [facility] ([facility] ) [phone]           Discharge Services: Home Care:  Occupational Therapy, Physical Therapy, Registered Nurse, and Home Health Aide  Discharge Instructions Verbalized to Patient at Discharge:   None    TOTAL DISCHARGE TIME:   Greater than 30 minutes  Electronically signed by:  CARMEL Lares CNP         Documentation of Face-to-Face and Certification for Home Health Services     Patient: Tyler Cisneros   YOB: 1935  MR Number: [id number]  Today's Date: 5/13/2025    I certify that patient: Tyler Cisneros is under my care and that I, or a nurse practitioner or physician's assistant working with me, had a face-to-face encounter that meets the physician face-to-face encounter requirements with this patient on: 5/13/2025.    This encounter with the patient was in whole, or in part, for the following medical condition, which is the primary reason for home health care:   1. Arthritis of right knee    2. S/P total knee arthroplasty, right    3. Acute pain of right knee    4. Leg edema, right    5. Slow transit constipation    6. Hypertension, unspecified type    7. Agitation          I certify that, based on my findings, the following services are medically necessary home health services: Nursing, Occupational Therapy, Physical  Therapy, and HHA.    My clinical findings support the need for the above services because: Nurse is needed: To provide caregiver training to assist with: med teaching.., Occupational Therapy Services are needed to assess and treat cognitive ability and address ADL safety due to impairment in transfer., Physical Therapy Services are needed to assess and treat the following functional impairments: gait., and HHA for bath.    Further, I certify that my clinical findings support that this patient is homebound (i.e. absences from home require considerable and taxing effort and are for medical reasons or [employer] services or infrequently or of short duration when for other reasons) because: Requires assistance of another person or specialized equipment to access medical services because patient: Is unable to operate assistive equipment on their own...    Based on the above findings. I certify that this patient is confined to the home and needs intermittent skilled nursing care, physical therapy and/or speech therapy.  The patient is under my care, and I have initiated the establishment of the plan of care.  This patient will be followed by a physician who will periodically review the plan of care.  Physician/Provider to provide follow up care: Jamila Sahu    Responsible Medicare certified PECOS Physician: Moiz Dinero NP  Electronic Physician Signature  Date: 5/13/2025

## 2025-05-13 NOTE — LETTER
5/13/2025      Tyler Cisneros  [address] [place] [address]        [facility] GERIATRICS DISCHARGE SUMMARY  PATIENT'S NAME: Tyler Cisneros  YOB: 1935  MEDICAL RECORD NUMBER:  [id number]  Place of Service where encounter took place:  [facility]  AT [facility] ([facility]) [[id number]]    PRIMARY CARE PROVIDER AND CLINIC RESPONSIBLE AFTER TRANSFER:   Jamila Sahu MD, [address]  / [facility] [address]    Non-G Provider     Transferring providers: CARMEL Reid CNP; Barry Castellon MD  Recent Hospitalization/ED:  [facility]  stay 4/28 to 4/29/2025.  Date of SNF Admission: 4/29/2025  Date of SNF (anticipated) Discharge: 5/15/2025  Discharged to: previous independent home  Cognitive Scores: NA  Physical Function: Pivot transfers  DME: Walker    CODE STATUS/ADVANCE DIRECTIVES DISCUSSION:  Full Code   ALLERGIES: Patient has no known allergies.    NURSING FACILITY COURSE   Medication Changes/Rationale:   See notes    Summary of nursing facility stay:      Arthritis of right knee  S/P total knee arthroplasty, right  Acute pain of right knee  Leg edema, right  Slow transit constipation  Hypertension, unspecified type  Agitation    (M17.11) Arthritis of right knee  (primary encounter diagnosis)  (Z96.651) S/P total knee arthroplasty, right  (M25.561) Acute pain of right knee  (R60.0) Leg edema, right  (K59.01) Slow transit constipation  Comment: extreme pain, dressing CDI, no recent BM, s/p orif 4/28, independent transfers  Plan:   -PT/OT eval and treat  -US RLE for edema  -lymph eval  -start keflex x7 days  -tubigrip RLE knee high  -change oxycodone to 5mg Q4h and Q4h PRN  -discontinue ibuprofen  -continue ASA81 BID  -continue APAP TID  -change miralax to daily  -change senna to 2 tabs daily  -CBC, BMP on 5/5  -follow up ortho 5/13     (I10) Hypertension, unspecified type  Comment: SBP's 160 range  Plan:   -continue PTA amlodipine, losartan  -daily vitals     (R45.1)  Agitation  Comment: unsure if normal personality, pain or opioid use, argumentative, not pleasant to staff  Plan:   -increase oxycodone as requested  -informed nursing of status  -of note, already standing, per therapy will be short stay    Discharge Medications:  MED REC REQUIRED  Post Medication Reconciliation Status: medication reconcilation previously completed during another office visit       Current Outpatient Medications   Medication Sig Dispense Refill     acetaminophen (TYLENOL) 500 MG tablet Take 2 tablets (1,000 mg) by mouth 3 times daily.       amLODIPine (NORVASC) 10 MG tablet Take 1 tablet by mouth daily.       aspirin (ASA) 81 MG EC tablet Take 81 mg by mouth 2 times daily.       cephALEXin (KEFLEX) 500 MG capsule Take 1 capsule (500 mg) by mouth 3 times daily for 7 days.       losartan (COZAAR) 50 MG tablet Take 50 mg by mouth daily.       Multiple Vitamins-Minerals (PRESERVISION AREDS 2 PO) Take 2 tablets by mouth daily.       naloxone (NARCAN) 4 MG/0.1ML nasal spray Spray 1 spray (4 mg) into one nostril alternating nostrils as needed for opioid reversal. every 2-3 minutes until assistance arrives       omeprazole 20 MG tablet Take 20 mg by mouth daily.       oxyCODONE (ROXICODONE) 5 MG tablet Take 1 tablet (5 mg) by mouth every 4 hours. And 2.5mg Q4h PRN pain 1-5/10, And 5mg Q4h PRN for pain 6-10/10. 60 tablet 0     polyethylene glycol (MIRALAX) 17 g packet Take 17 g by mouth daily.       sennosides (SENOKOT) 8.6 MG tablet Take 2 tablets by mouth daily.            Controlled medications:   Oxycodone being sent home     Past Medical History: No past medical history on file.  Physical Exam:   Vitals: BP (!) 163/61   Pulse 70   Temp 97.2  F (36.2  C)   Resp 18   Wt 88.9 kg (196 lb)   SpO2 95%   BMI: There is no height or weight on file to calculate BMI.  GENERAL APPEARANCE:  in no distress, appears healthy  ENT:  Mouth and posterior oropharynx normal, moist mucous membranes  RESP:  lungs clear  to auscultation , no respiratory distress  CV:  peripheral edema mild+ in RLE, rate-normal  ABDOMEN:  bowel sounds normal  M/S:   Gait and station abnormal unsteady  SKIN:  Inspection of skin and subcutaneous tissue baseline  NEURO:   Examination of sensation by touch normal  PSYCH:  affect and mood normal     SNF labs: Labs done in SNF are in [place] EPIC. Please refer to them using EPIC/Care Everywhere.    DISCHARGE PLAN:  Follow up labs: No labs orders/due  Medical Follow Up:      Follow up with primary care provider in 1 weeks  Current [place] scheduled appointments:  Appointments in Next Year      May 13, 2025 10:00 AM  Discharge Summary with CARMEL Lares CNP  [facility] ([facility] ) [phone]           Discharge Services: Home Care:  Occupational Therapy, Physical Therapy, Registered Nurse, and Home Health Aide  Discharge Instructions Verbalized to Patient at Discharge:   None    TOTAL DISCHARGE TIME:   Greater than 30 minutes  Electronically signed by:  CARMEL Lares CNP         Documentation of Face-to-Face and Certification for Home Health Services     Patient: Tyler Cisneros   YOB: 1935  MR Number: [id number]  Today's Date: 5/13/2025    I certify that patient: Tyler Cisneros is under my care and that I, or a nurse practitioner or physician's assistant working with me, had a face-to-face encounter that meets the physician face-to-face encounter requirements with this patient on: 5/13/2025.    This encounter with the patient was in whole, or in part, for the following medical condition, which is the primary reason for home health care:   1. Arthritis of right knee    2. S/P total knee arthroplasty, right    3. Acute pain of right knee    4. Leg edema, right    5. Slow transit constipation    6. Hypertension, unspecified type    7. Agitation          I certify that, based on my findings, the following  services are medically necessary home health services: Nursing, Occupational Therapy, Physical Therapy, and HHA.    My clinical findings support the need for the above services because: Nurse is needed: To provide caregiver training to assist with: med teaching.., Occupational Therapy Services are needed to assess and treat cognitive ability and address ADL safety due to impairment in transfer., Physical Therapy Services are needed to assess and treat the following functional impairments: gait., and HHA for bath.    Further, I certify that my clinical findings support that this patient is homebound (i.e. absences from home require considerable and taxing effort and are for medical reasons or [employer] services or infrequently or of short duration when for other reasons) because: Requires assistance of another person or specialized equipment to access medical services because patient: Is unable to operate assistive equipment on their own...    Based on the above findings. I certify that this patient is confined to the home and needs intermittent skilled nursing care, physical therapy and/or speech therapy.  The patient is under my care, and I have initiated the establishment of the plan of care.  This patient will be followed by a physician who will periodically review the plan of care.  Physician/Provider to provide follow up care: Jamila Sahu    Responsible Medicare certified PECOS Physician: Moiz Dinero NP  Electronic Physician Signature  Date: 5/13/2025                 Sincerely,        CARMEL Lares CNP    Electronically signed